# Patient Record
(demographics unavailable — no encounter records)

---

## 2025-02-03 NOTE — ASSESSMENT
[FreeTextEntry1] : 55 year old male with PMH Type 2 DM complicated by neuropathy, CAD s/p PCI, Afib, HTN, HLD here for follow up of diabetes.   His diabetes is well controlled.  1. Type 2 DM-   Check labs now.  Will increase Mounjaro to 15 mg qweek for more weight loss effect.  Continue Farxiga, but reduce metformin to 1000 gm daily.    2. HLD- continue Atorvastatin.   3. HTN- continue Losartan.   Follow up in 4 months.

## 2025-02-03 NOTE — HISTORY OF PRESENT ILLNESS
[FreeTextEntry1] : Follow up DM  History of Type 2 DM for about 15 years.  Was managed by his PCP in [place], but moved to [place] and his new PCP, Dr. Graham, recommended seeking endocrine care due to high A1c of 10%. He does have associated complications of neuropathy and CAD s/p PCI. His control has improved with lifestyle modification and medications.   Was diagnosed with RA and started on Plaquenil.    SMBG:  has been lax with testing lately.     Current diabetic medication regimen: Metformin ER 2000 mg daily  Farxiga 10 mg daily Mounjaro 12.5 mg qweek (recently increased by cardiologist) Stopped Trulicity at initial visit due to nausea.     Seperated from his wife last year, so went through some depression.

## 2025-02-03 NOTE — DATA REVIEWED
[FreeTextEntry1] : LABS:  10/8/2024: A1c 5.9% LDL 72 TSH 2.13 UACR < 0.2  9/26/2023: A1c 6.6%   7/6/2022: LDL 96 Trig 118 Creatinine 0.8 A1c 10% Cortisol 17

## 2025-06-26 NOTE — REVIEW OF SYSTEMS
[Recent Weight Gain (___ Lbs)] : recent weight gain: [unfilled] lbs [Diarrhea] : diarrhea [Nausea] : no nausea

## 2025-06-26 NOTE — HISTORY OF PRESENT ILLNESS
[FreeTextEntry1] : Follow up DM  History of Type 2 DM for about 15 years.  Was managed by his PCP in [place], but moved to [place] and his new PCP, Dr. Graham, recommended seeking endocrine care due to high A1c of 10%. He does have associated complications of neuropathy and CAD s/p PCI. His control has improved with lifestyle modification and medications.   Was diagnosed with RA and started on Plaquenil.    SMBG:  has been lax with testing lately.     Current diabetic medication regimen: Metformin ER 1000 mg BID (did not reduce as suggested at last visit).   Farxiga 10 mg daily Mounjaro 15 mg qweek (increased last visit) Stopped Trulicity at initial visit due to nausea.     Has gained some weight since last visit.

## 2025-06-26 NOTE — DATA REVIEWED
[FreeTextEntry1] : LABS:  6/24/2025: UACR 4 LDL 22 A1c 5.7%  10/8/2024: A1c 5.9% LDL 72 TSH 2.13 UACR < 0.2  9/26/2023: A1c 6.6%   7/6/2022: LDL 96 Trig 118 Creatinine 0.8 A1c 10% Cortisol 17

## 2025-06-26 NOTE — PHYSICAL EXAM
[Healthy Appearance] : healthy appearance [No Acute Distress] : no acute distress [Normal Sclera/Conjunctiva] : normal sclera/conjunctiva [No Proptosis] : no proptosis [No Neck Mass] : no neck mass was observed [No LAD] : no lymphadenopathy [Supple] : the neck was supple [Thyroid Not Enlarged] : the thyroid was not enlarged [No Thyroid Nodules] : no palpable thyroid nodules [No Respiratory Distress] : no respiratory distress [Clear to Auscultation] : lungs were clear to auscultation bilaterally [Normal S1, S2] : normal S1 and S2 [No Murmurs] : no murmurs [Normal Rate] : heart rate was normal [Regular Rhythm] : with a regular rhythm [Normal Affect] : the affect was normal [Normal Insight/Judgement] : insight and judgment were intact [Normal Mood] : the mood was normal [No Edema] : no peripheral edema [Acanthosis Nigricans] : no acanthosis nigricans

## 2025-06-26 NOTE — ASSESSMENT
[FreeTextEntry1] : 56 year old male with PMH Type 2 DM complicated by neuropathy, CAD s/p PCI, Afib, HTN, HLD here for follow up of diabetes.   His diabetes is well controlled.  1. Type 2 DM-    Continue Mounjaro and Farxiga.  Due to low A1c, will reduce Metformin to 500 mg BID.  Advised increased exercise. 2. HLD- continue Atorvastatin.   3. HTN- continue Losartan.   Follow up in 6 months.